# Patient Record
Sex: FEMALE | URBAN - NONMETROPOLITAN AREA
[De-identification: names, ages, dates, MRNs, and addresses within clinical notes are randomized per-mention and may not be internally consistent; named-entity substitution may affect disease eponyms.]

---

## 2019-08-27 ENCOUNTER — APPOINTMENT (RX ONLY)
Dept: URBAN - NONMETROPOLITAN AREA CLINIC 6 | Facility: CLINIC | Age: 39
Setting detail: DERMATOLOGY
End: 2019-08-27

## 2019-08-27 DIAGNOSIS — L30.9 DERMATITIS, UNSPECIFIED: ICD-10-CM

## 2019-08-27 PROBLEM — F41.9 ANXIETY DISORDER, UNSPECIFIED: Status: ACTIVE | Noted: 2019-08-27

## 2019-08-27 PROBLEM — M12.9 ARTHROPATHY, UNSPECIFIED: Status: ACTIVE | Noted: 2019-08-27

## 2019-08-27 PROCEDURE — ? TREATMENT REGIMEN

## 2019-08-27 PROCEDURE — 99202 OFFICE O/P NEW SF 15 MIN: CPT

## 2019-08-27 PROCEDURE — ? PRESCRIPTION

## 2019-08-27 RX ORDER — PERMETHRIN 50 MG/G
APPLY TO ENTIRE BODY FROM NECK TO TOES CREAM TOPICAL AT HS
Qty: 2 | Refills: 0 | Status: ERX | COMMUNITY
Start: 2019-08-27

## 2019-08-27 RX ADMIN — PERMETHRIN APPLY TO ENTIRE BODY FROM NECK TO TOES: 50 CREAM TOPICAL at 20:42

## 2019-08-27 ASSESSMENT — LOCATION ZONE DERM
LOCATION ZONE: LEG
LOCATION ZONE: TRUNK
LOCATION ZONE: ARM

## 2019-08-27 ASSESSMENT — LOCATION DETAILED DESCRIPTION DERM
LOCATION DETAILED: RIGHT INFERIOR MEDIAL UPPER BACK
LOCATION DETAILED: RIGHT ANTERIOR DISTAL THIGH
LOCATION DETAILED: RIGHT VENTRAL PROXIMAL FOREARM
LOCATION DETAILED: LEFT CLAVICULAR SKIN
LOCATION DETAILED: LEFT VENTRAL PROXIMAL FOREARM
LOCATION DETAILED: LEFT ANTERIOR PROXIMAL THIGH

## 2019-08-27 ASSESSMENT — LOCATION SIMPLE DESCRIPTION DERM
LOCATION SIMPLE: LEFT FOREARM
LOCATION SIMPLE: RIGHT THIGH
LOCATION SIMPLE: LEFT THIGH
LOCATION SIMPLE: RIGHT UPPER BACK
LOCATION SIMPLE: RIGHT FOREARM
LOCATION SIMPLE: LEFT CLAVICULAR SKIN

## 2019-08-27 NOTE — HPI: ITCHING
How Did Your Itching Occur?: sudden in onset (over a period of weeks to a few months)
How Severe Is Your Itching?: moderate
Additional History: She has had this rash since mid June, She started Fluoxetine first part June and about 3 to 4 weeks after starting the Fluoxetine she started itching every where and also got hives. Was taken off the Fluoxetine end of July and started on Prednisone dose pack. Stopped itching on Prednisone and so was given Lexapro and was on that for two weeks but when she went off Prednisone started itching terrible so she gradually weaned herself off the Lexapro. Shower seem to make itch better, night is worse, it wakes her up Was given Zyrtec but hasn’t started it because she wanted to come here first. Soap is Equate, but for sensitive skin, Laundry detergent is Baby All, no dryer sheets, Aveeno eczema lotion but does not use that every day. She does use a body spray called Hard Candy but only sprays on clothes. \\nNo one else in household itches.  Works at [id number].  Only travel was to [place].

## 2019-08-27 NOTE — PROCEDURE: TREATMENT REGIMEN
Initiate Treatment: Permethrin cream to apply to entire body from neck to toes at HS, will wash off in AM. Repeat in 1 week.  If itching is improved she will need to discuss treatment of entire family with their PCP.
Detail Level: Zone
Plan: Will call me in 10 days and will let me know how she is doing. If this does not resolve the issue we will biopsy